# Patient Record
Sex: FEMALE | Race: ASIAN | Employment: UNEMPLOYED | URBAN - METROPOLITAN AREA
[De-identification: names, ages, dates, MRNs, and addresses within clinical notes are randomized per-mention and may not be internally consistent; named-entity substitution may affect disease eponyms.]

---

## 2019-01-24 ENCOUNTER — HOSPITAL ENCOUNTER (OUTPATIENT)
Dept: GENERAL RADIOLOGY | Age: 29
Discharge: HOME | End: 2019-01-24
Payer: SUBSIDIZED

## 2019-01-24 DIAGNOSIS — M79.671 PAIN OF BOTH HEELS: ICD-10-CM

## 2019-01-24 DIAGNOSIS — M79.672 PAIN OF BOTH HEELS: ICD-10-CM

## 2019-01-24 PROCEDURE — 73630 X-RAY EXAM OF FOOT: CPT

## 2019-04-08 ENCOUNTER — HOSPITAL ENCOUNTER (OUTPATIENT)
Dept: MAMMOGRAPHY | Age: 29
Discharge: HOME | End: 2019-04-08
Attending: FAMILY MEDICINE

## 2019-04-08 DIAGNOSIS — N64.52 NIPPLE DISCHARGE: ICD-10-CM

## 2019-04-19 ENCOUNTER — HOSPITAL ENCOUNTER (OUTPATIENT)
Dept: MAMMOGRAPHY | Age: 29
Discharge: HOME | End: 2019-04-19
Attending: FAMILY MEDICINE
Payer: SUBSIDIZED

## 2019-04-19 DIAGNOSIS — N64.52 NIPPLE DISCHARGE: ICD-10-CM

## 2019-04-19 PROCEDURE — 76642 ULTRASOUND BREAST LIMITED: CPT

## 2019-05-14 ENCOUNTER — HOSPITAL ENCOUNTER (OUTPATIENT)
Dept: LAB | Age: 29
Discharge: HOME | End: 2019-05-14

## 2019-05-14 LAB
ALBUMIN SERPL-MCNC: 3.7 G/DL (ref 3.5–5)
ALBUMIN/GLOB SERPL: 1.2 {RATIO} (ref 1.1–2.2)
ALP SERPL-CCNC: 80 U/L (ref 45–117)
ALT SERPL-CCNC: 15 U/L (ref 12–78)
ANION GAP SERPL CALC-SCNC: 6 MMOL/L (ref 5–15)
AST SERPL-CCNC: 15 U/L (ref 15–37)
BASOPHILS # BLD: 0.1 K/UL (ref 0–0.1)
BASOPHILS NFR BLD: 1 % (ref 0–1)
BILIRUB SERPL-MCNC: 0.2 MG/DL (ref 0.2–1)
BUN SERPL-MCNC: 13 MG/DL (ref 6–20)
BUN/CREAT SERPL: 19 (ref 12–20)
CALCIUM SERPL-MCNC: 8.6 MG/DL (ref 8.5–10.1)
CHLORIDE SERPL-SCNC: 112 MMOL/L (ref 97–108)
CO2 SERPL-SCNC: 23 MMOL/L (ref 21–32)
CREAT SERPL-MCNC: 0.7 MG/DL (ref 0.55–1.02)
DIFFERENTIAL METHOD BLD: ABNORMAL
EOSINOPHIL # BLD: 0.6 K/UL (ref 0–0.4)
EOSINOPHIL NFR BLD: 9 % (ref 0–7)
ERYTHROCYTE [DISTWIDTH] IN BLOOD BY AUTOMATED COUNT: 13.4 % (ref 11.5–14.5)
GLOBULIN SER CALC-MCNC: 3 G/DL (ref 2–4)
GLUCOSE SERPL-MCNC: 116 MG/DL (ref 65–100)
HCT VFR BLD AUTO: 39.8 % (ref 35–47)
HGB BLD-MCNC: 12.2 G/DL (ref 11.5–16)
IMM GRANULOCYTES # BLD AUTO: 0 K/UL (ref 0–0.04)
IMM GRANULOCYTES NFR BLD AUTO: 0 % (ref 0–0.5)
LYMPHOCYTES # BLD: 2 K/UL (ref 0.8–3.5)
LYMPHOCYTES NFR BLD: 32 % (ref 12–49)
MCH RBC QN AUTO: 27.4 PG (ref 26–34)
MCHC RBC AUTO-ENTMCNC: 30.7 G/DL (ref 30–36.5)
MCV RBC AUTO: 89.2 FL (ref 80–99)
MONOCYTES # BLD: 0.5 K/UL (ref 0–1)
MONOCYTES NFR BLD: 8 % (ref 5–13)
NEUTS SEG # BLD: 3.2 K/UL (ref 1.8–8)
NEUTS SEG NFR BLD: 50 % (ref 32–75)
NRBC # BLD: 0 K/UL (ref 0–0.01)
NRBC BLD-RTO: 0 PER 100 WBC
PLATELET # BLD AUTO: 142 K/UL (ref 150–400)
PMV BLD AUTO: 14.3 FL (ref 8.9–12.9)
POTASSIUM SERPL-SCNC: 4 MMOL/L (ref 3.5–5.1)
PROT SERPL-MCNC: 6.7 G/DL (ref 6.4–8.2)
RBC # BLD AUTO: 4.46 M/UL (ref 3.8–5.2)
SODIUM SERPL-SCNC: 141 MMOL/L (ref 136–145)
TSH SERPL DL<=0.05 MIU/L-ACNC: 0.37 UIU/ML (ref 0.36–3.74)
WBC # BLD AUTO: 6.3 K/UL (ref 3.6–11)

## 2019-05-14 PROCEDURE — 84146 ASSAY OF PROLACTIN: CPT

## 2019-05-14 PROCEDURE — 84443 ASSAY THYROID STIM HORMONE: CPT

## 2019-05-14 PROCEDURE — 80053 COMPREHEN METABOLIC PANEL: CPT

## 2019-05-14 PROCEDURE — 84439 ASSAY OF FREE THYROXINE: CPT

## 2019-05-14 PROCEDURE — 85025 COMPLETE CBC W/AUTO DIFF WBC: CPT

## 2019-05-14 PROCEDURE — 84480 ASSAY TRIIODOTHYRONINE (T3): CPT

## 2019-05-15 LAB — PROLACTIN SERPL-MCNC: 11.3 NG/ML

## 2019-05-16 LAB — T4 FREE SERPL-MCNC: 1.1 NG/DL (ref 0.8–1.5)

## 2019-05-17 LAB — T3 SERPL-MCNC: 96 NG/DL (ref 71–180)

## 2019-05-24 ENCOUNTER — HOSPITAL ENCOUNTER (OUTPATIENT)
Dept: ULTRASOUND IMAGING | Age: 29
Discharge: HOME | End: 2019-05-24
Attending: FAMILY MEDICINE
Payer: SUBSIDIZED

## 2019-05-24 DIAGNOSIS — N92.1 MENORRHAGIA WITH IRREGULAR CYCLE: ICD-10-CM

## 2019-05-24 PROCEDURE — 76830 TRANSVAGINAL US NON-OB: CPT

## 2019-05-24 PROCEDURE — 76856 US EXAM PELVIC COMPLETE: CPT

## 2019-09-26 ENCOUNTER — HOSPITAL ENCOUNTER (OUTPATIENT)
Dept: LAB | Age: 29
Discharge: HOME | End: 2019-09-26

## 2019-09-26 PROCEDURE — 87086 URINE CULTURE/COLONY COUNT: CPT

## 2019-09-28 LAB
BACTERIA SPEC CULT: ABNORMAL
BACTERIA SPEC CULT: ABNORMAL
CC UR VC: ABNORMAL
SERVICE CMNT-IMP: ABNORMAL

## 2019-12-09 ENCOUNTER — HOSPITAL ENCOUNTER (OUTPATIENT)
Dept: ULTRASOUND IMAGING | Age: 29
Discharge: HOME | End: 2019-12-09
Attending: FAMILY MEDICINE
Payer: SUBSIDIZED

## 2019-12-09 ENCOUNTER — HOSPITAL ENCOUNTER (OUTPATIENT)
Dept: LAB | Age: 29
Discharge: HOME | End: 2019-12-09

## 2019-12-09 DIAGNOSIS — N83.202 CYST OF LEFT OVARY: ICD-10-CM

## 2019-12-09 PROCEDURE — 76830 TRANSVAGINAL US NON-OB: CPT

## 2019-12-09 PROCEDURE — 76856 US EXAM PELVIC COMPLETE: CPT

## 2019-12-09 PROCEDURE — 87086 URINE CULTURE/COLONY COUNT: CPT

## 2019-12-11 LAB
BACTERIA SPEC CULT: NORMAL
CC UR VC: NORMAL
SERVICE CMNT-IMP: NORMAL

## 2019-12-23 ENCOUNTER — HOSPITAL ENCOUNTER (OUTPATIENT)
Dept: LAB | Age: 29
Discharge: HOME | End: 2019-12-23

## 2019-12-23 PROCEDURE — 87086 URINE CULTURE/COLONY COUNT: CPT

## 2019-12-25 LAB
BACTERIA SPEC CULT: NORMAL
CC UR VC: NORMAL
SERVICE CMNT-IMP: NORMAL

## 2020-01-06 ENCOUNTER — HOSPITAL ENCOUNTER (OUTPATIENT)
Dept: LAB | Age: 30
Discharge: HOME | End: 2020-01-06

## 2020-01-06 LAB
ALBUMIN SERPL-MCNC: 3.6 G/DL (ref 3.5–5)
ALBUMIN/GLOB SERPL: 1.1 {RATIO} (ref 1.1–2.2)
ALP SERPL-CCNC: 93 U/L (ref 45–117)
ALT SERPL-CCNC: 20 U/L (ref 12–78)
ANION GAP SERPL CALC-SCNC: 4 MMOL/L (ref 5–15)
AST SERPL-CCNC: 13 U/L (ref 15–37)
BILIRUB SERPL-MCNC: 0.2 MG/DL (ref 0.2–1)
BUN SERPL-MCNC: 9 MG/DL (ref 6–20)
BUN/CREAT SERPL: 16 (ref 12–20)
CALCIUM SERPL-MCNC: 8.5 MG/DL (ref 8.5–10.1)
CHLORIDE SERPL-SCNC: 109 MMOL/L (ref 97–108)
CO2 SERPL-SCNC: 27 MMOL/L (ref 21–32)
CREAT SERPL-MCNC: 0.55 MG/DL (ref 0.55–1.02)
GLOBULIN SER CALC-MCNC: 3.4 G/DL (ref 2–4)
GLUCOSE SERPL-MCNC: 88 MG/DL (ref 65–100)
HCG SERPL QL: NEGATIVE
POTASSIUM SERPL-SCNC: 4.6 MMOL/L (ref 3.5–5.1)
PROT SERPL-MCNC: 7 G/DL (ref 6.4–8.2)
SODIUM SERPL-SCNC: 140 MMOL/L (ref 136–145)

## 2020-01-06 PROCEDURE — 80053 COMPREHEN METABOLIC PANEL: CPT

## 2020-01-06 PROCEDURE — 84703 CHORIONIC GONADOTROPIN ASSAY: CPT

## 2020-01-06 PROCEDURE — 36415 COLL VENOUS BLD VENIPUNCTURE: CPT

## 2020-01-06 PROCEDURE — 85025 COMPLETE CBC W/AUTO DIFF WBC: CPT

## 2020-01-07 LAB
BASOPHILS # BLD: 0.1 K/UL (ref 0–0.1)
BASOPHILS NFR BLD: 1 % (ref 0–1)
DIFFERENTIAL METHOD BLD: NORMAL
EOSINOPHIL # BLD: 0.2 K/UL (ref 0–0.4)
EOSINOPHIL NFR BLD: 3 % (ref 0–7)
ERYTHROCYTE [DISTWIDTH] IN BLOOD BY AUTOMATED COUNT: 14 % (ref 11.5–14.5)
HCT VFR BLD AUTO: 38.5 % (ref 35–47)
HGB BLD-MCNC: 11.9 G/DL (ref 11.5–16)
IMM GRANULOCYTES # BLD AUTO: 0 K/UL (ref 0–0.04)
IMM GRANULOCYTES NFR BLD AUTO: 0 % (ref 0–0.5)
LYMPHOCYTES # BLD: 2.8 K/UL (ref 0.8–3.5)
LYMPHOCYTES NFR BLD: 39 % (ref 12–49)
MCH RBC QN AUTO: 27.4 PG (ref 26–34)
MCHC RBC AUTO-ENTMCNC: 30.9 G/DL (ref 30–36.5)
MCV RBC AUTO: 88.7 FL (ref 80–99)
MONOCYTES # BLD: 0.4 K/UL (ref 0–1)
MONOCYTES NFR BLD: 6 % (ref 5–13)
NEUTS SEG # BLD: 3.6 K/UL (ref 1.8–8)
NEUTS SEG NFR BLD: 51 % (ref 32–75)
NRBC # BLD: 0 K/UL (ref 0–0.01)
NRBC BLD-RTO: 0 PER 100 WBC
PLATELET # BLD AUTO: 161 K/UL (ref 150–400)
PLATELET COMMENTS,PCOM: NORMAL
RBC # BLD AUTO: 4.34 M/UL (ref 3.8–5.2)
RBC MORPH BLD: NORMAL
WBC # BLD AUTO: 7.1 K/UL (ref 3.6–11)

## 2021-03-02 ENCOUNTER — TRANSCRIBE ORDER (OUTPATIENT)
Dept: SCHEDULING | Age: 31
End: 2021-03-02

## 2021-03-02 DIAGNOSIS — R10.2 PELVIC PAIN: Primary | ICD-10-CM

## 2024-01-26 ENCOUNTER — OFFICE VISIT (OUTPATIENT)
Age: 34
End: 2024-01-26
Payer: MEDICAID

## 2024-01-26 VITALS
SYSTOLIC BLOOD PRESSURE: 112 MMHG | BODY MASS INDEX: 34.05 KG/M2 | HEIGHT: 63 IN | HEART RATE: 74 BPM | TEMPERATURE: 96.8 F | WEIGHT: 192.2 LBS | OXYGEN SATURATION: 98 % | RESPIRATION RATE: 18 BRPM | DIASTOLIC BLOOD PRESSURE: 69 MMHG

## 2024-01-26 DIAGNOSIS — K21.9 GASTROESOPHAGEAL REFLUX DISEASE WITHOUT ESOPHAGITIS: ICD-10-CM

## 2024-01-26 DIAGNOSIS — M25.562 ARTHRALGIA OF BOTH KNEES: ICD-10-CM

## 2024-01-26 DIAGNOSIS — G89.29 CHRONIC NECK PAIN: ICD-10-CM

## 2024-01-26 DIAGNOSIS — M54.2 CHRONIC NECK PAIN: ICD-10-CM

## 2024-01-26 DIAGNOSIS — M25.561 ARTHRALGIA OF BOTH KNEES: ICD-10-CM

## 2024-01-26 DIAGNOSIS — Z76.89 ENCOUNTER TO ESTABLISH CARE: Primary | ICD-10-CM

## 2024-01-26 DIAGNOSIS — R79.89 ELEVATED SERUM FREE T4 LEVEL: ICD-10-CM

## 2024-01-26 PROCEDURE — 99204 OFFICE O/P NEW MOD 45 MIN: CPT | Performed by: STUDENT IN AN ORGANIZED HEALTH CARE EDUCATION/TRAINING PROGRAM

## 2024-01-26 RX ORDER — OMEPRAZOLE 20 MG/1
20 TABLET, DELAYED RELEASE ORAL DAILY
COMMUNITY
Start: 2024-01-08 | End: 2024-01-26

## 2024-01-26 RX ORDER — NORETHINDRONE ACETATE/ETHINYL ESTRADIOL AND FERROUS FUMARATE 1MG-20(21)
KIT ORAL
COMMUNITY
Start: 2023-11-21 | End: 2024-01-26

## 2024-01-26 RX ORDER — SUCRALFATE 1 G/1
1 TABLET ORAL 3 TIMES DAILY
COMMUNITY
Start: 2023-12-10 | End: 2024-01-26

## 2024-01-26 RX ORDER — METHYLPREDNISOLONE 4 MG/1
TABLET ORAL
COMMUNITY
Start: 2024-01-16 | End: 2024-01-26

## 2024-01-26 RX ORDER — METOCLOPRAMIDE 10 MG/1
10 TABLET ORAL 2 TIMES DAILY
COMMUNITY
Start: 2023-12-10 | End: 2024-01-26

## 2024-01-26 RX ORDER — MELOXICAM 7.5 MG/1
7.5 TABLET ORAL DAILY
Qty: 90 TABLET | Refills: 1 | Status: SHIPPED | OUTPATIENT
Start: 2024-01-26

## 2024-01-26 SDOH — ECONOMIC STABILITY: FOOD INSECURITY: WITHIN THE PAST 12 MONTHS, YOU WORRIED THAT YOUR FOOD WOULD RUN OUT BEFORE YOU GOT MONEY TO BUY MORE.: NEVER TRUE

## 2024-01-26 SDOH — ECONOMIC STABILITY: HOUSING INSECURITY
IN THE LAST 12 MONTHS, WAS THERE A TIME WHEN YOU DID NOT HAVE A STEADY PLACE TO SLEEP OR SLEPT IN A SHELTER (INCLUDING NOW)?: NO

## 2024-01-26 SDOH — ECONOMIC STABILITY: INCOME INSECURITY: HOW HARD IS IT FOR YOU TO PAY FOR THE VERY BASICS LIKE FOOD, HOUSING, MEDICAL CARE, AND HEATING?: NOT HARD AT ALL

## 2024-01-26 SDOH — ECONOMIC STABILITY: FOOD INSECURITY: WITHIN THE PAST 12 MONTHS, THE FOOD YOU BOUGHT JUST DIDN'T LAST AND YOU DIDN'T HAVE MONEY TO GET MORE.: NEVER TRUE

## 2024-01-26 ASSESSMENT — PATIENT HEALTH QUESTIONNAIRE - PHQ9
SUM OF ALL RESPONSES TO PHQ QUESTIONS 1-9: 2
SUM OF ALL RESPONSES TO PHQ9 QUESTIONS 1 & 2: 2
SUM OF ALL RESPONSES TO PHQ QUESTIONS 1-9: 2
SUM OF ALL RESPONSES TO PHQ QUESTIONS 1-9: 2
1. LITTLE INTEREST OR PLEASURE IN DOING THINGS: 1
2. FEELING DOWN, DEPRESSED OR HOPELESS: 1
SUM OF ALL RESPONSES TO PHQ QUESTIONS 1-9: 2

## 2024-01-26 NOTE — PROGRESS NOTES
Chief Complaint   Patient presents with    New Patient     Body ache   Left and knee pain  Can barely go up a flight a stairs.  Medication refill        \"Have you been to the ER, urgent care clinic since your last visit?  Hospitalized since your last visit?\"    NO    “Have you seen or consulted any other health care providers outside of [facility] since your last visit?”    NO        “Have you had a pap smear?”    NO           Vitals:    24 1129   BP: 112/69   Pulse: 74   Resp: 18   Temp: 96.8 °F (36 °C)   SpO2: 98%        Health Maintenance Due   Topic Date Due    Hepatitis B vaccine (1 of 3 - 3-dose series) Never done    COVID-19 Vaccine (1) Never done    Varicella vaccine (1 of 2 - 2-dose childhood series) Never done    Depression Screen  Never done    HIV screen  Never done    Hepatitis C screen  Never done    DTaP/Tdap/Td vaccine (1 - Tdap) Never done    Cervical cancer screen  Never done    Flu vaccine (1) Never done        The patient, Alice Potter, identity was verified by name and .   
medications such as metoclopramide in the past without improvement in symptoms or only temporary relief.  She is [facility]     Labs  Patient also brings in a copy of her labs completed on 1/15.  Labs significant for elevated T4 of 1.2, low T3 of 0.83, with a normal TSH of 0.7600.  Hemoglobin A1c was 5.16.  Vitamin B12, folate were normal.  CBC was also within normal limits.  Rheumatoid factor was negative and CAROL screening was negative.  CRP was also negative.  Normal vitamin D level.  Cholesterol was low the HDL at 38 otherwise within normal limits.  CMP was also normal      Review of systems:   A comprehensive review of systems was negative except as written in the HPI.    History    No Known Allergies    History reviewed. No pertinent past medical history.    Past Surgical History:   Procedure Laterality Date    TONSILLECTOMY          Family History   Problem Relation Age of Onset    Hypertension Other         throughout the family        Social History     Socioeconomic History    Marital status:      Spouse name: Not on file    Number of children: Not on file    Years of education: 1    Highest education level: Not on file   Occupational History    Not on file   Tobacco Use    Smoking status: Never    Smokeless tobacco: Never   Vaping Use    Vaping Use: Never used   Substance and Sexual Activity    Alcohol use: Never    Drug use: Never    Sexual activity: Yes     Partners: Male   Other Topics Concern    Not on file   Social History Narrative    Not on file     Social Determinants of Health     Financial Resource Strain: Low Risk  (1/26/2024)    Overall Financial Resource Strain (CARDIA)     Difficulty of Paying Living Expenses: Not hard at all   Food Insecurity: No Food Insecurity (1/26/2024)    Hunger Vital Sign     Worried About Running Out of Food in the Last Year: Never true     Ran Out of Food in the Last Year: Never true

## 2024-02-09 ENCOUNTER — OFFICE VISIT (OUTPATIENT)
Age: 34
End: 2024-02-09
Payer: MEDICAID

## 2024-02-09 VITALS
HEIGHT: 63 IN | WEIGHT: 193.4 LBS | DIASTOLIC BLOOD PRESSURE: 77 MMHG | SYSTOLIC BLOOD PRESSURE: 113 MMHG | OXYGEN SATURATION: 99 % | BODY MASS INDEX: 34.27 KG/M2 | TEMPERATURE: 98.2 F | RESPIRATION RATE: 18 BRPM | HEART RATE: 68 BPM

## 2024-02-09 DIAGNOSIS — R07.81 RIB PAIN: ICD-10-CM

## 2024-02-09 DIAGNOSIS — M54.2 CHRONIC NECK PAIN: Primary | ICD-10-CM

## 2024-02-09 DIAGNOSIS — L30.9 DERMATITIS: ICD-10-CM

## 2024-02-09 DIAGNOSIS — M99.01 SOMATIC DYSFUNCTION OF CERVICAL REGION: ICD-10-CM

## 2024-02-09 DIAGNOSIS — G89.29 CHRONIC NECK PAIN: Primary | ICD-10-CM

## 2024-02-09 DIAGNOSIS — M99.08 SOMATIC DYSFUNCTION OF RIB: ICD-10-CM

## 2024-02-09 DIAGNOSIS — M99.00 SOMATIC DYSFUNCTION OF HEAD REGION: ICD-10-CM

## 2024-02-09 DIAGNOSIS — M99.06 SOMATIC DYSFUNCTION OF LOWER EXTREMITY: ICD-10-CM

## 2024-02-09 DIAGNOSIS — M99.02 SOMATIC DYSFUNCTION OF THORACIC REGION: ICD-10-CM

## 2024-02-09 PROCEDURE — 99213 OFFICE O/P EST LOW 20 MIN: CPT | Performed by: STUDENT IN AN ORGANIZED HEALTH CARE EDUCATION/TRAINING PROGRAM

## 2024-02-09 PROCEDURE — 98927 OSTEOPATH MANJ 5-6 REGIONS: CPT | Performed by: STUDENT IN AN ORGANIZED HEALTH CARE EDUCATION/TRAINING PROGRAM

## 2024-02-09 RX ORDER — BENZOCAINE/MENTHOL 6 MG-10 MG
LOZENGE MUCOUS MEMBRANE
Qty: 30 G | Refills: 2 | Status: SHIPPED | OUTPATIENT
Start: 2024-02-09 | End: 2024-02-16

## 2024-02-09 RX ORDER — CYCLOBENZAPRINE HCL 5 MG
5 TABLET ORAL NIGHTLY PRN
Qty: 30 TABLET | Refills: 1 | Status: SHIPPED | OUTPATIENT
Start: 2024-02-09

## 2024-02-09 RX ORDER — DOCUSATE SODIUM 100 MG/1
200 CAPSULE, LIQUID FILLED ORAL DAILY
COMMUNITY
Start: 2024-01-31

## 2024-02-09 RX ORDER — PANTOPRAZOLE SODIUM 40 MG/1
40 TABLET, DELAYED RELEASE ORAL DAILY
COMMUNITY
Start: 2024-01-31

## 2024-02-09 RX ORDER — POLYETHYLENE GLYCOL 3350 17 G/17G
POWDER, FOR SOLUTION ORAL
COMMUNITY
Start: 2024-01-31

## 2024-02-09 RX ORDER — POLYETHYLENE GLYCOL 3350, SODIUM SULFATE ANHYDROUS, SODIUM BICARBONATE, SODIUM CHLORIDE, POTASSIUM CHLORIDE 236; 22.74; 6.74; 5.86; 2.97 G/4L; G/4L; G/4L; G/4L; G/4L
POWDER, FOR SOLUTION ORAL
COMMUNITY
Start: 2024-02-02

## 2024-02-09 SDOH — ECONOMIC STABILITY: FOOD INSECURITY: WITHIN THE PAST 12 MONTHS, THE FOOD YOU BOUGHT JUST DIDN'T LAST AND YOU DIDN'T HAVE MONEY TO GET MORE.: NEVER TRUE

## 2024-02-09 SDOH — ECONOMIC STABILITY: INCOME INSECURITY: HOW HARD IS IT FOR YOU TO PAY FOR THE VERY BASICS LIKE FOOD, HOUSING, MEDICAL CARE, AND HEATING?: NOT HARD AT ALL

## 2024-02-09 SDOH — ECONOMIC STABILITY: FOOD INSECURITY: WITHIN THE PAST 12 MONTHS, YOU WORRIED THAT YOUR FOOD WOULD RUN OUT BEFORE YOU GOT MONEY TO BUY MORE.: NEVER TRUE

## 2024-02-09 ASSESSMENT — PATIENT HEALTH QUESTIONNAIRE - PHQ9
SUM OF ALL RESPONSES TO PHQ QUESTIONS 1-9: 0
1. LITTLE INTEREST OR PLEASURE IN DOING THINGS: 0
SUM OF ALL RESPONSES TO PHQ QUESTIONS 1-9: 0
SUM OF ALL RESPONSES TO PHQ QUESTIONS 1-9: 0
2. FEELING DOWN, DEPRESSED OR HOPELESS: 0
SUM OF ALL RESPONSES TO PHQ9 QUESTIONS 1 & 2: 0
SUM OF ALL RESPONSES TO PHQ QUESTIONS 1-9: 0

## 2024-02-09 NOTE — PROGRESS NOTES
Chief Complaint   Patient presents with    Follow-up     OMT        \"Have you been to the ER, urgent care clinic since your last visit?  Hospitalized since your last visit?\"    NO    “Have you seen or consulted any other [facility]?”    YES - When: approximately 10 days ago.  Where and Why: [facility] Dr. Tripp.        “Have you had a pap smear?”    NO           Vitals:    24 1106   BP: 113/77   Pulse: 68   Resp: 18   Temp: 98.2 °F (36.8 °C)   SpO2: 99%        Health Maintenance Due   Topic Date Due    Hepatitis B vaccine (1 of 3 - 3-dose series) Never done    COVID-19 Vaccine (1) Never done    Varicella vaccine (1 of 2 - 2-dose childhood series) Never done    HIV screen  Never done    Hepatitis C screen  Never done    DTaP/Tdap/Td vaccine (1 - Tdap) Never done    Cervical cancer screen  Never done    Flu vaccine (1) Never done        The patient, Alice Potter, identity was verified by name and .   
verbalized understanding with the diagnosis(es) and plan.       Return in about 4 weeks (around 3/8/2024) for Follow-up. For mood and pain    Va Hernandez DO    02/09/24   1:56 PM

## 2024-02-09 NOTE — PATIENT INSTRUCTIONS
Post-OMT Instructions    It is not uncommon to have some mild soreness for 1-2 days after treatment. You can take Tylenol or Ibuprofen as instructed to help with the discomfort. You can also apply gentle heat to areas with sore muscles. Be sure to drink plenty of water to help your muscles recover. Avoid strenuous activity for the rest of today.

## 2024-02-22 NOTE — PROGRESS NOTES
[facility]  [address] [place] [address] [phone]    Osteopathic Manipulative Medicine Visit   (Procedure Note)              Subjective     CC:   Chief Complaint   Patient presents with    Follow-up     OMT  Patient states she has stopped physical therapy but she do the exercise at home.      HPI: Alice Potter is a 34 y.o. female presenting to the clinic today for evaluation and consideration of OMT for the following concerns:  She presents with her  who serves as her .  Patient offered video  but declined.  Pain  Patient has had pain in her knees and ankles for the past 8 months.  Is currently going to physical therapy and states that this has been helping with her leg pain.  Does have chronic neck pain which she states gets worse especially during times of stress.  X-ray did demonstrate some osteophytes.  Previous interventions: Previous medical treatment, physical therapy  Hardware present?  None  Previous imaging?     Cervical spine x-ray 01/26/2024  The spinal alignment is normal.  Vertebral morphology is normal. Anterior disc osteophytes at C5-6 and C6-7. There are no identifiable paravertebral soft tissue abnormalities. Prevertebral soft tissues unremarkable. Atlanto-dental interval within normal limits. No evidence of subluxation.   IMPRESSION:  No acute fracture or dislocation.      Rash  -Patient states that she has also had a rash on her legs, neck, and trunk for the past few days due to.  States that it causes some itching.  Denies changing detergents.  Unsure the cause    Review of systems:   A comprehensive review of systems was negative except as written in the HPI.    History  Patient's allergies, medical, surgical, social, and family hx reviewed and available in chart. Updates made where appropriate.       Objective     /74 (Site: Right Upper Arm, Position: Sitting, Cuff Size: Large Adult)

## 2024-02-23 ENCOUNTER — OFFICE VISIT (OUTPATIENT)
Age: 34
End: 2024-02-23
Payer: MEDICAID

## 2024-02-23 VITALS
TEMPERATURE: 97.7 F | RESPIRATION RATE: 18 BRPM | HEIGHT: 63 IN | OXYGEN SATURATION: 98 % | BODY MASS INDEX: 34.48 KG/M2 | HEART RATE: 74 BPM | SYSTOLIC BLOOD PRESSURE: 112 MMHG | WEIGHT: 194.6 LBS | DIASTOLIC BLOOD PRESSURE: 74 MMHG

## 2024-02-23 DIAGNOSIS — F41.9 ANXIETY AND DEPRESSION: ICD-10-CM

## 2024-02-23 DIAGNOSIS — N64.4 CYCLICAL BREAST PAIN: Primary | ICD-10-CM

## 2024-02-23 DIAGNOSIS — G89.29 CHRONIC PAIN OF LEFT KNEE: ICD-10-CM

## 2024-02-23 DIAGNOSIS — F32.A ANXIETY AND DEPRESSION: ICD-10-CM

## 2024-02-23 DIAGNOSIS — M25.562 CHRONIC PAIN OF LEFT KNEE: ICD-10-CM

## 2024-02-23 PROCEDURE — 99214 OFFICE O/P EST MOD 30 MIN: CPT | Performed by: STUDENT IN AN ORGANIZED HEALTH CARE EDUCATION/TRAINING PROGRAM

## 2024-02-23 RX ORDER — DULOXETIN HYDROCHLORIDE 30 MG/1
30 CAPSULE, DELAYED RELEASE ORAL DAILY
Qty: 90 CAPSULE | Refills: 1 | Status: SHIPPED | OUTPATIENT
Start: 2024-02-23

## 2024-02-23 ASSESSMENT — PATIENT HEALTH QUESTIONNAIRE - PHQ9
SUM OF ALL RESPONSES TO PHQ9 QUESTIONS 1 & 2: 0
SUM OF ALL RESPONSES TO PHQ QUESTIONS 1-9: 0
1. LITTLE INTEREST OR PLEASURE IN DOING THINGS: 0
SUM OF ALL RESPONSES TO PHQ QUESTIONS 1-9: 0
SUM OF ALL RESPONSES TO PHQ QUESTIONS 1-9: 0
2. FEELING DOWN, DEPRESSED OR HOPELESS: 0
SUM OF ALL RESPONSES TO PHQ QUESTIONS 1-9: 0

## 2024-02-23 NOTE — PROGRESS NOTES
[facility]  [address] [place] [address] [phone]    Office Visit      Assessment and Plan     1. Cyclical breast pain  Chronic, uncontrolled.  Suspect that patient has some hormonal breast tenderness that corresponds with her cycles.  Does have fibrocystic breast tissue without any evidence of mass or infection on examination.  Trial Voltaren gel.    - diclofenac sodium (VOLTAREN) 1 % GEL; Apply 2 g topically 4 times daily as needed for Pain  Dispense: 100 g; Refill: 3    2. Chronic pain of left knee  Chronic, uncontrolled.  Trial Voltaren gel.  If pain continues, would recommend steroid injection  - diclofenac sodium (VOLTAREN) 1 % GEL; Apply 2 g topically 4 times daily as needed for Pain  Dispense: 100 g; Refill: 3    3. Anxiety and depression  Chronic, uncontrolled.  Start Cymbalta.  Suspect that this may help with the pain as well  - DULoxetine (CYMBALTA) 30 MG extended release capsule; Take 1 capsule by mouth daily  Dispense: 90 capsule; Refill: 1        Return in about 8 weeks (around 4/19/2024) for Follow-up.         Discussed the expected course, resolution and complications of the diagnosis(es) in detail.  Medication risks, benefits, costs, interactions, and alternatives were discussed as indicated.  Patient to contact the office if their condition worsens, changes or fails to improve. Pt verbalized understanding with the diagnosis(es) and plan.           Va Hernandez DO    02/23/24   4:36 PM        Subjective     CC:   Chief Complaint   Patient presents with    Follow-up     OMT  Patient states she has stopped physical therapy but she do the exercise at home.        Pt  has no past medical history on file.    HPI: Alice Potter is a 34 y.o. female presenting to the clinic today for evaluation and/or follow-up of the following concerns:      Breast pain  Patient reports that back in 2019, she had an ultrasound for her breast due

## 2024-02-23 NOTE — PROGRESS NOTES
Chief Complaint   Patient presents with    Follow-up     OMT  Patient states she has stopped physical therapy but she do the exercise at home.         \"Have you been to the ER, urgent care clinic since your last visit?  Hospitalized since your last visit?\"    NO    “Have you seen or consulted any other health care providers outside of [facility] since your last visit?”    NO        “Have you had a pap smear?”    NO           Vitals:    24 1135   BP: 112/74   Pulse: 74   Resp: 18   Temp: 97.7 °F (36.5 °C)   SpO2: 98%        Health Maintenance Due   Topic Date Due    Hepatitis B vaccine (1 of 3 - 3-dose series) Never done    COVID-19 Vaccine (1) Never done    Varicella vaccine (1 of 2 - 2-dose childhood series) Never done    HIV screen  Never done    Hepatitis C screen  Never done    DTaP/Tdap/Td vaccine (1 - Tdap) Never done    Cervical cancer screen  Never done    Flu vaccine (1) Never done        The patient, Alice Potter, identity was verified by name and .

## 2024-04-12 ENCOUNTER — OFFICE VISIT (OUTPATIENT)
Age: 34
End: 2024-04-12
Payer: MEDICAID

## 2024-04-12 VITALS
OXYGEN SATURATION: 98 % | BODY MASS INDEX: 33.27 KG/M2 | WEIGHT: 187.8 LBS | TEMPERATURE: 97.6 F | HEART RATE: 66 BPM | HEIGHT: 63 IN | RESPIRATION RATE: 20 BRPM | DIASTOLIC BLOOD PRESSURE: 75 MMHG | SYSTOLIC BLOOD PRESSURE: 111 MMHG

## 2024-04-12 DIAGNOSIS — G89.29 CHRONIC PAIN OF LEFT KNEE: ICD-10-CM

## 2024-04-12 DIAGNOSIS — N94.6 DYSMENORRHEA: ICD-10-CM

## 2024-04-12 DIAGNOSIS — M25.562 CHRONIC PAIN OF LEFT KNEE: ICD-10-CM

## 2024-04-12 DIAGNOSIS — Z02.9 ENCOUNTER FOR ADMINISTRATIVE EXAMINATIONS: Primary | ICD-10-CM

## 2024-04-12 DIAGNOSIS — J30.2 SEASONAL ALLERGIES: ICD-10-CM

## 2024-04-12 DIAGNOSIS — F41.9 ANXIETY AND DEPRESSION: ICD-10-CM

## 2024-04-12 DIAGNOSIS — F32.A ANXIETY AND DEPRESSION: ICD-10-CM

## 2024-04-12 PROCEDURE — 99215 OFFICE O/P EST HI 40 MIN: CPT | Performed by: STUDENT IN AN ORGANIZED HEALTH CARE EDUCATION/TRAINING PROGRAM

## 2024-04-12 RX ORDER — FLUTICASONE PROPIONATE 50 MCG
2 SPRAY, SUSPENSION (ML) NASAL DAILY
Qty: 1 EACH | Refills: 5 | Status: SHIPPED | OUTPATIENT
Start: 2024-04-12

## 2024-04-12 RX ORDER — NORETHINDRONE ACETATE AND ETHINYL ESTRADIOL 1MG-20(21)
1 KIT ORAL DAILY
Qty: 3 PACKET | Refills: 3 | Status: SHIPPED | OUTPATIENT
Start: 2024-04-12

## 2024-04-12 ASSESSMENT — PATIENT HEALTH QUESTIONNAIRE - PHQ9
1. LITTLE INTEREST OR PLEASURE IN DOING THINGS: NOT AT ALL
SUM OF ALL RESPONSES TO PHQ QUESTIONS 1-9: 0

## 2024-04-12 NOTE — PROGRESS NOTES
Chief Complaint   Patient presents with    Paperwork        \"Have you been to the ER, urgent care clinic since your last visit?  Hospitalized since your last visit?\"    NO    “Have you seen or consulted any other health care providers outside of [facility] since your last visit?”    NO        “Have you had a pap smear?”    NO    No cervical cancer screening on file            Vitals:    24 1137   BP: 111/75   Pulse: 66   Resp: 20   Temp: 97.6 °F (36.4 °C)   SpO2: 98%        Health Maintenance Due   Topic Date Due    Hepatitis B vaccine (1 of 3 - 3-dose series) Never done    COVID-19 Vaccine (1) Never done    Varicella vaccine (1 of 2 - 2-dose childhood series) Never done    HIV screen  Never done    Hepatitis C screen  Never done    DTaP/Tdap/Td vaccine (1 - Tdap) Never done    Cervical cancer screen  Never done        The patient, Alice Potter, identity was verified by name and .

## 2024-04-12 NOTE — PROGRESS NOTES
[facility]  [address] [place] [address] [phone]    Office Visit      Assessment and Plan     1. Encounter for administrative examinations  Based upon previous encounters with patient, patient's history, and examination today, I do suspect the patient has an underlying learning disability along with uncontrolled depression anxiety has likely caused issues with short-term memory which has made it very difficult for her to learn new skills/tasks including the English language.  Patient is able to understand the meaning of such except explained in basic terms in her own language, but is unable to recite any memorized information word to word.  Will complete these forms and have them mailed to patient    2. Chronic pain of left knee  Chronic, uncontrolled.  Patient has not had much relief with conservative measures and exercises.  Will have her return for steroid injection    3. Anxiety and depression  Chronic, improving.  Continue Cymbalta, can increase to next dose at next visit    4. Dysmenorrhea  Chronic, stable.  - norethindrone-ethinyl estradiol (LOESTRIN FE 1/20) 1-20 MG-MCG per tablet; Take 1 tablet by mouth daily  Dispense: 3 packet; Refill: 3    5. Seasonal allergies  Chronic, stable.  - fluticasone (FLONASE) 50 MCG/ACT nasal spray; 2 sprays by Each Nostril route daily  Dispense: 1 each; Refill: 5        RTC in 1 month for steroid injection    Total time: 65 minutes. This includes time spent with the patient during the visit as well as time spent before and after the visit reviewing the chart, documenting the encounter, making phone calls, reviewing studies, etc.      Discussed the expected course, resolution and complications of the diagnosis(es) in detail.  Medication risks, benefits, costs, interactions, and alternatives were discussed as indicated.  Patient to contact the office if their condition worsens, changes or fails to improve.

## 2024-04-25 PROBLEM — F81.9 BASIC LEARNING DISABILITY: Status: ACTIVE | Noted: 2024-04-25

## 2024-05-10 ENCOUNTER — OFFICE VISIT (OUTPATIENT)
Age: 34
End: 2024-05-10
Payer: MEDICAID

## 2024-05-10 VITALS
HEIGHT: 63 IN | HEART RATE: 68 BPM | WEIGHT: 187.6 LBS | RESPIRATION RATE: 20 BRPM | OXYGEN SATURATION: 99 % | SYSTOLIC BLOOD PRESSURE: 107 MMHG | DIASTOLIC BLOOD PRESSURE: 69 MMHG | TEMPERATURE: 98.1 F | BODY MASS INDEX: 33.24 KG/M2

## 2024-05-10 DIAGNOSIS — G89.29 CHRONIC PAIN OF RIGHT KNEE: ICD-10-CM

## 2024-05-10 DIAGNOSIS — M25.561 CHRONIC PAIN OF RIGHT KNEE: ICD-10-CM

## 2024-05-10 DIAGNOSIS — F81.9 BASIC LEARNING DISABILITY: ICD-10-CM

## 2024-05-10 DIAGNOSIS — G89.29 CHRONIC PAIN OF LEFT KNEE: Primary | ICD-10-CM

## 2024-05-10 DIAGNOSIS — M25.562 CHRONIC PAIN OF LEFT KNEE: Primary | ICD-10-CM

## 2024-05-10 DIAGNOSIS — J30.2 SEASONAL ALLERGIES: ICD-10-CM

## 2024-05-10 DIAGNOSIS — N94.6 DYSMENORRHEA: ICD-10-CM

## 2024-05-10 PROCEDURE — 20610 DRAIN/INJ JOINT/BURSA W/O US: CPT | Performed by: STUDENT IN AN ORGANIZED HEALTH CARE EDUCATION/TRAINING PROGRAM

## 2024-05-10 PROCEDURE — 99215 OFFICE O/P EST HI 40 MIN: CPT | Performed by: STUDENT IN AN ORGANIZED HEALTH CARE EDUCATION/TRAINING PROGRAM

## 2024-05-10 RX ORDER — TRIAMCINOLONE ACETONIDE 40 MG/ML
40 INJECTION, SUSPENSION INTRA-ARTICULAR; INTRAMUSCULAR ONCE
Status: COMPLETED | OUTPATIENT
Start: 2024-05-10 | End: 2024-05-10

## 2024-05-10 RX ORDER — NORETHINDRONE ACETATE AND ETHINYL ESTRADIOL 1MG-20(21)
1 KIT ORAL DAILY
Qty: 3 PACKET | Refills: 4 | Status: SHIPPED | OUTPATIENT
Start: 2024-05-10

## 2024-05-10 RX ORDER — LIDOCAINE HYDROCHLORIDE 20 MG/ML
4 INJECTION, SOLUTION INFILTRATION; PERINEURAL ONCE
Status: COMPLETED | OUTPATIENT
Start: 2024-05-10 | End: 2024-05-10

## 2024-05-10 RX ORDER — CETIRIZINE HYDROCHLORIDE 10 MG/1
10 TABLET ORAL DAILY
Qty: 90 TABLET | Refills: 1 | Status: SHIPPED | OUTPATIENT
Start: 2024-05-10

## 2024-05-10 RX ADMIN — TRIAMCINOLONE ACETONIDE 40 MG: 40 INJECTION, SUSPENSION INTRA-ARTICULAR; INTRAMUSCULAR at 13:47

## 2024-05-10 RX ADMIN — LIDOCAINE HYDROCHLORIDE 4 ML: 20 INJECTION, SOLUTION INFILTRATION; PERINEURAL at 13:46

## 2024-05-10 ASSESSMENT — PATIENT HEALTH QUESTIONNAIRE - PHQ9
SUM OF ALL RESPONSES TO PHQ QUESTIONS 1-9: 0
1. LITTLE INTEREST OR PLEASURE IN DOING THINGS: NOT AT ALL
2. FEELING DOWN, DEPRESSED OR HOPELESS: NOT AT ALL
SUM OF ALL RESPONSES TO PHQ9 QUESTIONS 1 & 2: 0
SUM OF ALL RESPONSES TO PHQ QUESTIONS 1-9: 0

## 2024-05-10 NOTE — PROGRESS NOTES
Chief Complaint   Patient presents with    Injections     Both knee          \"Have you been to the ER, urgent care clinic since your last visit?  Hospitalized since your last visit?\"    NO    “Have you seen or consulted any other health care providers outside of [facility] since your last visit?”    NO        “Have you had a pap smear?”    NO    No cervical cancer screening on file            Vitals:    05/10/24 1155   BP: 107/69   Pulse: 68   Resp: 20   Temp: 98.1 °F (36.7 °C)   SpO2: 99%        Health Maintenance Due   Topic Date Due    Hepatitis B vaccine (1 of 3 - 3-dose series) Never done    COVID-19 Vaccine (1) Never done    Varicella vaccine (1 of 2 - 2-dose childhood series) Never done    HIV screen  Never done    Hepatitis C screen  Never done    DTaP/Tdap/Td vaccine (1 - Tdap) Never done    Cervical cancer screen  Never done        The patient, Alice Potter, identity was verified by name and .

## 2024-05-10 NOTE — PROGRESS NOTES
[facility]  [address] [place] [address] [phone]      Procedure Note:    Procedure Name: Knee Joint Injection-left  Indication: Pain  Pre-Procedure Diagnosis: Chronic Left Knee Pain  Post-Procedure Diagnosis: Same    Informed Consent and Counseling: The procedure, alternative treatment options, risks, and benefits were thoroughly explained to the patient and written informed consent was obtained.  Appropriate equipment and medications were set up.    PROCEDURE:  The appropriate timeout was taken. We identified and marked the appropriate anatomic landmarks to guide needle placement.The area was prepped in the usual sterile fashion and the overlying skin cleaned using povidone iodine .  Local anesthesia achieved using Ethyl Chloride spray (Cooling spray). 2 ml of Lidocaine 2% without epinephrine and 1ml of Kenalog 40 mg/1mL in a needle of appropriate length and gauge was injected into the joint using lateral approach. Gentle aspiration before injection didn’t show any blood.    Estimated blood loss was less than 0.5 cc.    A dressing was applied to the area. Anticipatory guidance, as well as standard post-procedure care, was explained. Return precautions were given. The patient tolerated the procedure well without complications. Follow-up visit scheduled.       Procedure Note:    Procedure Name: Knee Joint Injection-right  Indication: Pain  Pre-Procedure Diagnosis: Chronic Right Knee Pain  Post-Procedure Diagnosis: Same    Informed Consent and Counseling: The procedure, alternative treatment options, risks, and benefits were thoroughly explained to the patient and written informed consent was obtained.  Appropriate equipment and medications were set up.    PROCEDURE:  The appropriate timeout was taken. We identified and marked the appropriate anatomic landmarks to guide needle placement.The area was prepped in the usual sterile fashion and the

## 2024-05-10 NOTE — PROGRESS NOTES
[facility]  [address] [place] [address] [phone]    Office Visit      Assessment and Plan     1. Chronic pain of left knee  Chronic, uncontrolled.  Discussed at previous appointments that if patient was not having improvement in her pain, could consider steroid injections.  Reviewed the risk and benefits of injection with patient and all questions answered.  Injections provided.  Recommended for patient to continue to do her home exercise therapy as well  - triamcinolone acetonide (KENALOG-40) injection 40 mg  - lidocaine 2 % injection 4 mL  - DRAIN/INJECT LARGE JOINT/BURSA    2. Chronic pain of right knee  Chronic, uncontrolled.  Discussed at previous appointments that if patient was not having improvement in her pain, could consider steroid injections.  Reviewed the risk and benefits of injection with patient and all questions answered.  Injections provided.  Recommended for patient to continue to do her home exercise therapy as well  - triamcinolone acetonide (KENALOG-40) injection 40 mg  - lidocaine 2 % injection 4 mL  - DRAIN/INJECT LARGE JOINT/BURSA    3. Basic learning disability  Corrected patient's medical certification for disability form and also received patient's daily number to update the forms.  Will have this mailed off and also uploaded to patient's    4. Dysmenorrhea  Chronic, uncontrolled.  Switched to patient's preferred brand  - norethindrone-ethinyl estradiol (DENICE FE 1/20) 1-20 MG-MCG per tablet; Take 1 tablet by mouth daily  Dispense: 3 packet; Refill: 4    5. Seasonal allergies  Chronic, uncontrolled.  - cetirizine (ZYRTEC) 10 MG tablet; Take 1 tablet by mouth daily For allergies  Dispense: 90 tablet; Refill: 1        Return if symptoms worsen or fail to improve.       Total time: 48 minutes. This includes time spent with the patient during the visit as well as time spent before and after the visit reviewing the chart,

## 2024-07-05 ENCOUNTER — TELEPHONE (OUTPATIENT)
Age: 34
End: 2024-07-05

## 2024-07-05 NOTE — TELEPHONE ENCOUNTER
----- Message from Va Croft sent at 7/5/2024  3:41 PM EDT -----  Regarding: [facility] Appointment Request  [facility] Appointment Request    Patient needs appointment for [facility] Appointment Type: Existing Condition Follow Up.    Patient Requested Dates(s): anytime  Patient Requested Time:  Friday after lunch  or 11:00  Provider Name:Va Hernandez DO    Reason for Appointment Request: Established Patient - No appointments available during search  --------------------------------------------------------------------------------------------------------------------------    Relationship to Patient: Spouse/Partner/Edilson Potter  Call Back Information: OK to leave message on voicemail  Preferred Call Back Number: Phone [phone] (Mobile)       Caller want to book an appointment for the patient for the follow of thyroid injection for knee.

## 2024-08-08 ENCOUNTER — OFFICE VISIT (OUTPATIENT)
Age: 34
End: 2024-08-08
Payer: MEDICAID

## 2024-08-08 VITALS
DIASTOLIC BLOOD PRESSURE: 74 MMHG | TEMPERATURE: 97.4 F | RESPIRATION RATE: 20 BRPM | SYSTOLIC BLOOD PRESSURE: 120 MMHG | BODY MASS INDEX: 34.09 KG/M2 | HEART RATE: 72 BPM | HEIGHT: 63 IN | WEIGHT: 192.4 LBS | OXYGEN SATURATION: 98 %

## 2024-08-08 DIAGNOSIS — G43.019 INTRACTABLE MIGRAINE WITHOUT AURA AND WITHOUT STATUS MIGRAINOSUS: Primary | ICD-10-CM

## 2024-08-08 DIAGNOSIS — M54.42 CHRONIC BILATERAL LOW BACK PAIN WITH LEFT-SIDED SCIATICA: ICD-10-CM

## 2024-08-08 DIAGNOSIS — G89.29 CHRONIC BILATERAL LOW BACK PAIN WITH LEFT-SIDED SCIATICA: ICD-10-CM

## 2024-08-08 PROCEDURE — 99214 OFFICE O/P EST MOD 30 MIN: CPT | Performed by: STUDENT IN AN ORGANIZED HEALTH CARE EDUCATION/TRAINING PROGRAM

## 2024-08-08 RX ORDER — CYCLOBENZAPRINE HCL 5 MG
TABLET ORAL
Qty: 30 TABLET | Refills: 1 | Status: SHIPPED | OUTPATIENT
Start: 2024-08-08

## 2024-08-08 RX ORDER — RIZATRIPTAN BENZOATE 10 MG/1
TABLET ORAL
Qty: 9 TABLET | Refills: 0 | Status: SHIPPED | OUTPATIENT
Start: 2024-08-08

## 2024-08-08 ASSESSMENT — PATIENT HEALTH QUESTIONNAIRE - PHQ9
SUM OF ALL RESPONSES TO PHQ9 QUESTIONS 1 & 2: 0
SUM OF ALL RESPONSES TO PHQ QUESTIONS 1-9: 0
SUM OF ALL RESPONSES TO PHQ QUESTIONS 1-9: 0
2. FEELING DOWN, DEPRESSED OR HOPELESS: NOT AT ALL
SUM OF ALL RESPONSES TO PHQ QUESTIONS 1-9: 0
1. LITTLE INTEREST OR PLEASURE IN DOING THINGS: NOT AT ALL
SUM OF ALL RESPONSES TO PHQ QUESTIONS 1-9: 0

## 2024-08-08 NOTE — PATIENT INSTRUCTIONS
1. Intractable migraine without aura and without status migrainosus  - rizatriptan (MAXALT) 10 MG tablet; Take 1 tablet by mouth once as needed for Migraine. May repeat in 2 hours if needed  Dispense: 9 tablet; Refill: 0    2. Chronic bilateral low back pain with left-sided sciatica  - cyclobenzaprine (FLEXERIL) 5 MG tablet; Take 1-2 tablets by mouth at bedtime for back pain  Dispense: 30 tablet; Refill: 1

## 2024-08-08 NOTE — PROGRESS NOTES
[facility]  [address] [place] [address] [phone]    Office Visit      Assessment and Plan     1. Intractable migraine without aura and without status migrainosus  Acute. Can use ginger for nausea   - rizatriptan (MAXALT) 10 MG tablet; Take 1 tablet by mouth once as needed for Migraine. May repeat in 2 hours if needed  Dispense: 9 tablet; Refill: 0    2. Chronic bilateral low back pain with left-sided sciatica  Chronic, controlled.  Try heat and stretches.   - cyclobenzaprine (FLEXERIL) 5 MG tablet; Take 1-2 tablets by mouth at bedtime for back pain  Dispense: 30 tablet; Refill: 1        Return in about 2 months (around 10/8/2024) for Follow-up.         Discussed the expected course, resolution and complications of the diagnosis(es) in detail.  Medication risks, benefits, costs, interactions, and alternatives were discussed as indicated.  Patient to contact the office if their condition worsens, changes or fails to improve. Pt verbalized understanding with the diagnosis(es) and plan.           Va Hernandez DO    08/08/24   1:10 PM        Subjective     CC:   Chief Complaint   Patient presents with    Follow-up     Patient complains of lower back pain and headaches        Pt  has no past medical history on file.    HPI: Alice Potter is a 34 y.o. female presenting to the clinic today for evaluation and/or follow-up of the following concerns:      MSK pain/ headache  Having headache for the past few days. Pain is mainly in the forehead and behind the eye. Pain is still present, but is getting somewhat better. Motrin and tylenol makes it better. Has some seasonal allergy symptoms. Not feeling this as much now. Some nausea. No clear aura but has light sensitivity  Went to GI doctor yesterday, BP was 140/80. Today BP is 120/74  Having lower back pain as well.  Whole body feels fatigued  Pain is in the lower lumbar, has been getting better and

## 2024-08-08 NOTE — PROGRESS NOTES
Chief Complaint   Patient presents with    Follow-up     Patient complains of lower back pain and headaches        \"Have you been to the ER, urgent care clinic since your last visit?  Hospitalized since your last visit?\"    NO    “Have you seen or consulted any other health care providers outside of [facility] since your last visit?”    NO     “Have you had a pap smear?”    NO    No cervical cancer screening on file             Click Here for Release of Records Request     No results found for this visit on 24.   Vitals:    24 1122   BP: 120/74   Site: Right Upper Arm   Position: Sitting   Cuff Size: Large Adult   Pulse: 72   Resp: 20   Temp: 97.4 °F (36.3 °C)   TempSrc: Temporal   SpO2: 98%   Weight: 87.3 kg (192 lb 6.4 oz)   Height: 1.6 m (5' 3\")      Health Maintenance Due   Topic Date Due    Hepatitis B vaccine (1 of 3 - 3-dose series) Never done    COVID-19 Vaccine (1) Never done    Varicella vaccine (1 of 2 - 2-dose childhood series) Never done    HIV screen  Never done    Hepatitis C screen  Never done    DTaP/Tdap/Td vaccine (1 - Tdap) Never done    Cervical cancer screen  Never done    Flu vaccine (1) Never done        The patient, Alice Potter, identity was verified by name and .

## 2024-09-20 ENCOUNTER — TELEPHONE (OUTPATIENT)
Age: 34
End: 2024-09-20

## 2024-09-20 NOTE — TELEPHONE ENCOUNTER
Spoke with pt  and he stated he need to see another Dr for his wife citizenship. I told him I have to ask another provider and will give him a call back Monday

## 2024-09-23 ENCOUNTER — TELEPHONE (OUTPATIENT)
Age: 34
End: 2024-09-23

## 2024-10-04 ENCOUNTER — TELEMEDICINE (OUTPATIENT)
Age: 34
End: 2024-10-04

## 2024-10-04 DIAGNOSIS — F81.9 BASIC LEARNING DISABILITY: ICD-10-CM

## 2024-10-04 DIAGNOSIS — G43.019 INTRACTABLE MIGRAINE WITHOUT AURA AND WITHOUT STATUS MIGRAINOSUS: ICD-10-CM

## 2024-10-04 DIAGNOSIS — Z02.9 ENCOUNTER FOR ADMINISTRATIVE EXAMINATIONS: Primary | ICD-10-CM

## 2024-10-04 DIAGNOSIS — F41.8 TEST ANXIETY: ICD-10-CM

## 2024-10-04 RX ORDER — RIZATRIPTAN BENZOATE 10 MG/1
TABLET ORAL
Qty: 9 TABLET | Refills: 0 | Status: SHIPPED | OUTPATIENT
Start: 2024-10-04

## 2024-10-04 RX ORDER — PROPRANOLOL HCL 10 MG
TABLET ORAL
Qty: 60 TABLET | Refills: 1 | Status: SHIPPED | OUTPATIENT
Start: 2024-10-04

## 2024-10-04 ASSESSMENT — PATIENT HEALTH QUESTIONNAIRE - PHQ9
SUM OF ALL RESPONSES TO PHQ9 QUESTIONS 1 & 2: 0
1. LITTLE INTEREST OR PLEASURE IN DOING THINGS: NOT AT ALL
SUM OF ALL RESPONSES TO PHQ QUESTIONS 1-9: 0
2. FEELING DOWN, DEPRESSED OR HOPELESS: NOT AT ALL

## 2024-10-04 NOTE — PROGRESS NOTES
Chief Complaint   Patient presents with    Follow-up     ER follow 10/2/2024       \"Have you been to the ER, urgent care clinic since your last visit?  Hospitalized since your last visit?\"    YES - When: approximately 3 days ago.  Where and Why: [facility] \" headache\".    “Have you seen or consulted any other health care providers outside of [facility] since your last visit?”    NO     “Have you had a pap smear?”    NO    No cervical cancer screening on file             Click Here for Release of Records Request     No results found for this visit on 10/04/24.   There were no vitals filed for this visit.   Health Maintenance Due   Topic Date Due    Varicella vaccine (1 of 2 - 13+ 2-dose series) Never done    HIV screen  Never done    Hepatitis C screen  Never done    Hepatitis B vaccine (1 of 3 - 19+ 3-dose series) Never done    DTaP/Tdap/Td vaccine (1 - Tdap) Never done    Cervical cancer screen  Never done    Flu vaccine (1) Never done    COVID-19 Vaccine (1 - 2023-24 season) Never done        The patient, Alice Potter, identity was verified by name and .

## 2024-10-04 NOTE — PROGRESS NOTES
[facility]  [address].  [place] [id number] [phone]    Virtual Visit    Alice Potter, was evaluated through a synchronous (real-time) audio-video encounter. The patient (or guardian if applicable) is aware that this is a billable service, which includes applicable co-pays. This Virtual Visit was conducted with patient's (and/or legal guardian's) consent. Patient identification was verified, and a caregiver was present when appropriate.   The patient was located at Home: [address] [address] [address]  Provider was located at Facility (Appt Dept): [address] [place] [address]      CC:   Chief Complaint   Patient presents with    Follow-up     ER follow 10/2/2024     Alice Potter (:  1990) is a Established patient, presenting virtually for evaluation of the following:  -Pt  has no past medical history on file.  -Patient was seen on camera today with her .  Patient declined formal  and would prefer for her  to translate for her.    Subjective  Patient and her  present today to discuss her citizenship paperwork.  She received notice from the department of immigration stating that they require a second evaluation from a different medical professional due to concerns of credibility.  Per my previous assessment, I was informed that patient has been unable to learn new languages and has been experiencing challenges when acquiring new skills due to her limited schooling and learning disability; along with her dependence on her .  However it appears that during the interview portion for her eligibility exam on , she was able to read, write, and answer questions regarding the [place] on the initial interview test without the assistance of her .  It also states that she is in possession of a newly issued 's license which her  states took her

## 2024-10-25 ENCOUNTER — OFFICE VISIT (OUTPATIENT)
Age: 34
End: 2024-10-25
Payer: MEDICAID

## 2024-10-25 VITALS
OXYGEN SATURATION: 98 % | HEIGHT: 63 IN | RESPIRATION RATE: 20 BRPM | WEIGHT: 189 LBS | SYSTOLIC BLOOD PRESSURE: 129 MMHG | TEMPERATURE: 97.9 F | DIASTOLIC BLOOD PRESSURE: 80 MMHG | BODY MASS INDEX: 33.49 KG/M2 | HEART RATE: 75 BPM

## 2024-10-25 DIAGNOSIS — F32.A ANXIETY AND DEPRESSION: ICD-10-CM

## 2024-10-25 DIAGNOSIS — F41.9 ANXIETY AND DEPRESSION: ICD-10-CM

## 2024-10-25 DIAGNOSIS — M54.42 CHRONIC BILATERAL LOW BACK PAIN WITH LEFT-SIDED SCIATICA: Primary | ICD-10-CM

## 2024-10-25 DIAGNOSIS — G89.29 CHRONIC BILATERAL LOW BACK PAIN WITH LEFT-SIDED SCIATICA: Primary | ICD-10-CM

## 2024-10-25 DIAGNOSIS — R73.03 PREDIABETES: ICD-10-CM

## 2024-10-25 DIAGNOSIS — G43.019 INTRACTABLE MIGRAINE WITHOUT AURA AND WITHOUT STATUS MIGRAINOSUS: ICD-10-CM

## 2024-10-25 LAB — HBA1C MFR BLD: 5.4 %

## 2024-10-25 PROCEDURE — 99214 OFFICE O/P EST MOD 30 MIN: CPT | Performed by: STUDENT IN AN ORGANIZED HEALTH CARE EDUCATION/TRAINING PROGRAM

## 2024-10-25 PROCEDURE — 83036 HEMOGLOBIN GLYCOSYLATED A1C: CPT | Performed by: STUDENT IN AN ORGANIZED HEALTH CARE EDUCATION/TRAINING PROGRAM

## 2024-10-25 RX ORDER — CYCLOBENZAPRINE HCL 5 MG
TABLET ORAL
Qty: 30 TABLET | Refills: 2 | Status: SHIPPED | OUTPATIENT
Start: 2024-10-25

## 2024-10-25 RX ORDER — TOPIRAMATE 50 MG/1
50 TABLET, FILM COATED ORAL 2 TIMES DAILY
Qty: 60 TABLET | Refills: 2 | Status: SHIPPED | OUTPATIENT
Start: 2024-11-24

## 2024-10-25 RX ORDER — TOPIRAMATE 25 MG/1
TABLET, FILM COATED ORAL
Qty: 77 TABLET | Refills: 0 | Status: SHIPPED | OUTPATIENT
Start: 2024-10-25 | End: 2024-11-22

## 2024-10-25 RX ORDER — DULOXETIN HYDROCHLORIDE 30 MG/1
30 CAPSULE, DELAYED RELEASE ORAL DAILY
Qty: 90 CAPSULE | Refills: 1 | Status: SHIPPED | OUTPATIENT
Start: 2024-10-25

## 2024-10-25 ASSESSMENT — PATIENT HEALTH QUESTIONNAIRE - PHQ9
SUM OF ALL RESPONSES TO PHQ QUESTIONS 1-9: 0
SUM OF ALL RESPONSES TO PHQ QUESTIONS 1-9: 0
2. FEELING DOWN, DEPRESSED OR HOPELESS: NOT AT ALL
SUM OF ALL RESPONSES TO PHQ QUESTIONS 1-9: 0
SUM OF ALL RESPONSES TO PHQ9 QUESTIONS 1 & 2: 0
SUM OF ALL RESPONSES TO PHQ QUESTIONS 1-9: 0
1. LITTLE INTEREST OR PLEASURE IN DOING THINGS: NOT AT ALL

## 2024-10-25 NOTE — PROGRESS NOTES
[facility]  [address] [place] [address] [phone]    Office Visit      Assessment and Plan     1. Chronic bilateral low back pain with left-sided sciatica  Chronic, uncontrolled.  Discussed that patient should restart the cyclobenzaprine at night.  Would also recommend applying heat to the lower back and stretching  - cyclobenzaprine (FLEXERIL) 5 MG tablet; Take 1-2 tablets by mouth at bedtime for back pain  Dispense: 30 tablet; Refill: 2    2. Intractable migraine without aura and without status migrainosus  Chronic, improved.  Continue to take the Cymbalta.  Patient has Maxalt as needed    3. Prediabetes  Chronic, resolved.  A1c today is under the prediabetic limit.  Discussed importance of continuing to limit carbohydrate intake  - AMB POC HEMOGLOBIN A1C    4. BMI 33.0-33.9,adult  Chronic, uncontrolled.  Nutritional recommendations given.  Start Topamax titrating to 50 mg twice daily  - topiramate (TOPAMAX) 25 MG tablet; Take 1 tablet by mouth nightly for 7 days, THEN 1 tablet in the morning and at bedtime for 7 days, THEN 2 tablets in the morning and at bedtime for 14 days.  Dispense: 77 tablet; Refill: 0  - topiramate (TOPAMAX) 50 MG tablet; Take 1 tablet by mouth 2 times daily  Dispense: 60 tablet; Refill: 2    5. Anxiety and depression  Chronic, stable.  - DULoxetine (CYMBALTA) 30 MG extended release capsule; Take 1 capsule by mouth daily  Dispense: 90 capsule; Refill: 1        RTC in 3 months        Discussed the expected course, resolution and complications of the diagnosis(es) in detail.  Medication risks, benefits, costs, interactions, and alternatives were discussed as indicated.  Patient to contact the office if their condition worsens, changes or fails to improve. Pt verbalized understanding with the diagnosis(es) and plan.           Va Hernandez DO    10/25/24   11:45 AM        Subjective     CC:   Chief Complaint   Patient

## 2024-10-25 NOTE — PATIENT INSTRUCTIONS
Topamax:           Sig: Take 1 tablet by mouth nightly for 7 days, THEN 1 tablet in the morning and at bedtime for 7 days, THEN 2 tablets in the morning and at bedtime for 14 days.

## 2024-10-25 NOTE — PROGRESS NOTES
Chief Complaint   Patient presents with    Migraine       \"Have you been to the ER, urgent care clinic since your last visit?  Hospitalized since your last visit?\"    NO    “Have you seen or consulted any other health care providers outside of [facility] since your last visit?”    NO     “Have you had a pap smear?”    NO    No cervical cancer screening on file             Click Here for Release of Records Request     No results found for this visit on 10/25/24.   Vitals:    10/25/24 1136   BP: 129/80   Site: Right Upper Arm   Position: Sitting   Cuff Size: Large Adult   Pulse: 75   Resp: 20   Temp: 97.9 °F (36.6 °C)   TempSrc: Temporal   SpO2: 98%   Weight: 85.7 kg (189 lb)   Height: 1.6 m (5' 3\")      Health Maintenance Due   Topic Date Due    Varicella vaccine (1 of 2 - 13+ 2-dose series) Never done    HIV screen  Never done    Hepatitis C screen  Never done    Hepatitis B vaccine (1 of 3 - 19+ 3-dose series) Never done    DTaP/Tdap/Td vaccine (1 - Tdap) Never done    Cervical cancer screen  Never done    Flu vaccine (1) Never done    COVID-19 Vaccine ( season) Never done        The patient, Alice Potter, identity was verified by name and .

## 2024-11-01 RX ORDER — TOPIRAMATE 25 MG/1
TABLET, FILM COATED ORAL
Qty: 77 TABLET | Refills: 0 | OUTPATIENT
Start: 2024-11-01

## 2024-12-02 ENCOUNTER — PATIENT MESSAGE (OUTPATIENT)
Age: 34
End: 2024-12-02

## 2024-12-02 DIAGNOSIS — G43.019 INTRACTABLE MIGRAINE WITHOUT AURA AND WITHOUT STATUS MIGRAINOSUS: Primary | ICD-10-CM

## 2025-01-24 ENCOUNTER — OFFICE VISIT (OUTPATIENT)
Age: 35
End: 2025-01-24
Payer: MEDICAID

## 2025-01-24 VITALS
TEMPERATURE: 97.1 F | DIASTOLIC BLOOD PRESSURE: 67 MMHG | RESPIRATION RATE: 16 BRPM | HEIGHT: 63 IN | SYSTOLIC BLOOD PRESSURE: 99 MMHG | WEIGHT: 185 LBS | HEART RATE: 59 BPM | BODY MASS INDEX: 32.78 KG/M2 | OXYGEN SATURATION: 96 %

## 2025-01-24 DIAGNOSIS — G43.019 INTRACTABLE MIGRAINE WITHOUT AURA AND WITHOUT STATUS MIGRAINOSUS: Primary | ICD-10-CM

## 2025-01-24 DIAGNOSIS — G89.29 CHRONIC BILATERAL LOW BACK PAIN WITH LEFT-SIDED SCIATICA: ICD-10-CM

## 2025-01-24 DIAGNOSIS — F41.9 ANXIETY AND DEPRESSION: ICD-10-CM

## 2025-01-24 DIAGNOSIS — M54.42 CHRONIC BILATERAL LOW BACK PAIN WITH LEFT-SIDED SCIATICA: ICD-10-CM

## 2025-01-24 DIAGNOSIS — F32.A ANXIETY AND DEPRESSION: ICD-10-CM

## 2025-01-24 DIAGNOSIS — L91.8 SKIN TAG: ICD-10-CM

## 2025-01-24 DIAGNOSIS — J32.0 CHRONIC LEFT MAXILLARY SINUSITIS: ICD-10-CM

## 2025-01-24 PROCEDURE — 99214 OFFICE O/P EST MOD 30 MIN: CPT | Performed by: STUDENT IN AN ORGANIZED HEALTH CARE EDUCATION/TRAINING PROGRAM

## 2025-01-24 RX ORDER — DULOXETIN HYDROCHLORIDE 30 MG/1
30 CAPSULE, DELAYED RELEASE ORAL DAILY
Qty: 90 CAPSULE | Refills: 1 | Status: SHIPPED | OUTPATIENT
Start: 2025-01-24

## 2025-01-24 RX ORDER — DICLOFENAC SODIUM 75 MG/1
75 TABLET, DELAYED RELEASE ORAL DAILY PRN
Qty: 30 TABLET | Refills: 3 | Status: SHIPPED | OUTPATIENT
Start: 2025-01-24

## 2025-01-24 RX ORDER — PROPRANOLOL HYDROCHLORIDE 60 MG/1
60 CAPSULE, EXTENDED RELEASE ORAL DAILY
COMMUNITY
Start: 2025-01-18

## 2025-01-24 RX ORDER — TRIAMCINOLONE ACETONIDE 55 UG/1
2 SPRAY, METERED NASAL DAILY
Qty: 1 EACH | Refills: 2 | Status: SHIPPED | OUTPATIENT
Start: 2025-01-24

## 2025-01-24 SDOH — ECONOMIC STABILITY: FOOD INSECURITY: WITHIN THE PAST 12 MONTHS, YOU WORRIED THAT YOUR FOOD WOULD RUN OUT BEFORE YOU GOT MONEY TO BUY MORE.: NEVER TRUE

## 2025-01-24 SDOH — ECONOMIC STABILITY: FOOD INSECURITY: WITHIN THE PAST 12 MONTHS, THE FOOD YOU BOUGHT JUST DIDN'T LAST AND YOU DIDN'T HAVE MONEY TO GET MORE.: NEVER TRUE

## 2025-01-24 ASSESSMENT — PATIENT HEALTH QUESTIONNAIRE - PHQ9
3. TROUBLE FALLING OR STAYING ASLEEP: NOT AT ALL
SUM OF ALL RESPONSES TO PHQ QUESTIONS 1-9: 0
SUM OF ALL RESPONSES TO PHQ QUESTIONS 1-9: 0
6. FEELING BAD ABOUT YOURSELF - OR THAT YOU ARE A FAILURE OR HAVE LET YOURSELF OR YOUR FAMILY DOWN: NOT AT ALL
5. POOR APPETITE OR OVEREATING: NOT AT ALL
8. MOVING OR SPEAKING SO SLOWLY THAT OTHER PEOPLE COULD HAVE NOTICED. OR THE OPPOSITE, BEING SO FIGETY OR RESTLESS THAT YOU HAVE BEEN MOVING AROUND A LOT MORE THAN USUAL: NOT AT ALL
10. IF YOU CHECKED OFF ANY PROBLEMS, HOW DIFFICULT HAVE THESE PROBLEMS MADE IT FOR YOU TO DO YOUR WORK, TAKE CARE OF THINGS AT HOME, OR GET ALONG WITH OTHER PEOPLE: NOT DIFFICULT AT ALL
4. FEELING TIRED OR HAVING LITTLE ENERGY: NOT AT ALL
1. LITTLE INTEREST OR PLEASURE IN DOING THINGS: NOT AT ALL
SUM OF ALL RESPONSES TO PHQ QUESTIONS 1-9: 0
SUM OF ALL RESPONSES TO PHQ QUESTIONS 1-9: 0
9. THOUGHTS THAT YOU WOULD BE BETTER OFF DEAD, OR OF HURTING YOURSELF: NOT AT ALL
SUM OF ALL RESPONSES TO PHQ9 QUESTIONS 1 & 2: 0
7. TROUBLE CONCENTRATING ON THINGS, SUCH AS READING THE NEWSPAPER OR WATCHING TELEVISION: NOT AT ALL
2. FEELING DOWN, DEPRESSED OR HOPELESS: NOT AT ALL

## 2025-01-24 NOTE — PROGRESS NOTES
[facility]  [address] [place] [address] [phone]    Office Visit      Assessment and Plan     1. Intractable migraine without aura and without status migrainosus  Chronic, uncontrolled.  Patient will continue to follow with neurology.  She is experiencing some lower blood pressures on the increased dose of propranolol, and discussed with patient that I recommend she talk with her neurologist about adjusting this medication at her next follow-up visit    2. Anxiety and depression  Chronic, stable  - DULoxetine (CYMBALTA) 30 MG extended release capsule; Take 1 capsule by mouth daily  Dispense: 90 capsule; Refill: 1    3. Chronic left maxillary sinusitis  Chronic, uncontrolled.  The preliminary read of the most recent MRI, it appears that patient has some chronic left maxillary sinusitis.  Discussed that this may be a component of her headache pain.  Will prescribe Augmentin.  Encouraged sinus rinses, and then start Nasacort  - amoxicillin-clavulanate (AUGMENTIN) 875-125 MG per tablet; Take 1 tablet by mouth 2 times daily for 10 days For chronic sinus infection  Dispense: 20 tablet; Refill: 0  - triamcinolone (NASACORT ALLERGY 24HR) 55 MCG/ACT nasal inhaler; 2 sprays by Each Nostril route daily  Dispense: 1 each; Refill: 2    4. Chronic bilateral low back pain with left-sided sciatica  Chronic, stable.  Continue cyclobenzaprine and diclofenac as needed  - diclofenac (VOLTAREN) 75 MG EC tablet; Take 1 tablet by mouth daily as needed for Pain  Dispense: 30 tablet; Refill: 3    5. Skin tag  Patient has multiple skin tags, discussed that she can return for removal    6. BMI 33.0-33.9,adult  Chronic, uncontrolled but improving.  Congratulated patient on her recent weight loss.  Nutritional recommendations given.  Discussed incorporating exercise as tolerated.  Stop Topamax for now since neurology encouraged patient to limit medications.  However can

## 2025-02-28 ENCOUNTER — OFFICE VISIT (OUTPATIENT)
Age: 35
End: 2025-02-28
Payer: MEDICAID

## 2025-02-28 VITALS
SYSTOLIC BLOOD PRESSURE: 134 MMHG | OXYGEN SATURATION: 99 % | WEIGHT: 187.2 LBS | TEMPERATURE: 97.8 F | HEIGHT: 63 IN | BODY MASS INDEX: 33.17 KG/M2 | RESPIRATION RATE: 20 BRPM | HEART RATE: 65 BPM | DIASTOLIC BLOOD PRESSURE: 84 MMHG

## 2025-02-28 DIAGNOSIS — G43.019 INTRACTABLE MIGRAINE WITHOUT AURA AND WITHOUT STATUS MIGRAINOSUS: Primary | ICD-10-CM

## 2025-02-28 DIAGNOSIS — F41.9 ANXIETY AND DEPRESSION: ICD-10-CM

## 2025-02-28 DIAGNOSIS — M54.42 CHRONIC BILATERAL LOW BACK PAIN WITH LEFT-SIDED SCIATICA: ICD-10-CM

## 2025-02-28 DIAGNOSIS — J32.0 CHRONIC LEFT MAXILLARY SINUSITIS: ICD-10-CM

## 2025-02-28 DIAGNOSIS — G89.29 CHRONIC BILATERAL LOW BACK PAIN WITH LEFT-SIDED SCIATICA: ICD-10-CM

## 2025-02-28 DIAGNOSIS — J30.2 SEASONAL ALLERGIES: ICD-10-CM

## 2025-02-28 DIAGNOSIS — F32.A ANXIETY AND DEPRESSION: ICD-10-CM

## 2025-02-28 PROCEDURE — 99214 OFFICE O/P EST MOD 30 MIN: CPT | Performed by: STUDENT IN AN ORGANIZED HEALTH CARE EDUCATION/TRAINING PROGRAM

## 2025-02-28 RX ORDER — LORATADINE 10 MG/1
10 TABLET ORAL DAILY
Qty: 90 TABLET | Refills: 2 | Status: SHIPPED | OUTPATIENT
Start: 2025-02-28

## 2025-02-28 RX ORDER — METHOCARBAMOL 500 MG/1
500 TABLET, FILM COATED ORAL 3 TIMES DAILY PRN
Qty: 60 TABLET | Refills: 2 | Status: SHIPPED | OUTPATIENT
Start: 2025-02-28

## 2025-02-28 RX ORDER — SUMATRIPTAN SUCCINATE 25 MG/1
TABLET ORAL
COMMUNITY
Start: 2025-02-04

## 2025-02-28 RX ORDER — DULOXETIN HYDROCHLORIDE 30 MG/1
60 CAPSULE, DELAYED RELEASE ORAL DAILY
Qty: 90 CAPSULE | Refills: 1 | Status: SHIPPED
Start: 2025-02-28

## 2025-02-28 ASSESSMENT — PATIENT HEALTH QUESTIONNAIRE - PHQ9
SUM OF ALL RESPONSES TO PHQ9 QUESTIONS 1 & 2: 0
SUM OF ALL RESPONSES TO PHQ QUESTIONS 1-9: 0
2. FEELING DOWN, DEPRESSED OR HOPELESS: NOT AT ALL
1. LITTLE INTEREST OR PLEASURE IN DOING THINGS: NOT AT ALL
SUM OF ALL RESPONSES TO PHQ QUESTIONS 1-9: 0

## 2025-02-28 NOTE — PROGRESS NOTES
[facility]  [address] [place] [address] [phone]    Office Visit      Assessment and Plan     1. Intractable migraine without aura and without status migrainosus  Chronic, improved.  Patient will continue to follow with neurology.  Symptoms have improved since last visit.    2. Chronic left maxillary sinusitis  Chronic, improving.  Patient's sinusitis improved after her last course of amoxicillin.  Continue Nasacort and add Claritin.  Discussed with patient that if her sinus issues continue, would recommend referral to ENT    3. Seasonal allergies  Chronic, uncontrolled  - loratadine (CLARITIN) 10 MG tablet; Take 1 tablet by mouth daily For allergies  Dispense: 90 tablet; Refill: 2    4. Chronic bilateral low back pain with left-sided sciatica  Chronic, uncontrolled but improving.  Continue Flexeril at night, can trial Robaxin during the day.  Discussed importance of gentle stretches and heat  - methocarbamol (ROBAXIN) 500 MG tablet; Take 1 tablet by mouth 3 times daily as needed (muscle spasms)  Dispense: 60 tablet; Refill: 2    5. Anxiety and depression  Chronic, stable.  Patient's anxiety and depressive symptoms have improved on increased dose of Cymbalta  - Duloxetine (CYMBALTA) 30 MG extended release capsule; Take 2 capsules by mouth daily  Dispense: 90 capsule; Refill: 1              RTC in 6 months for well adult visit      Discussed the expected course, resolution and complications of the diagnosis(es) in detail.  Medication risks, benefits, costs, interactions, and alternatives were discussed as indicated.  Patient to contact the office if their condition worsens, changes or fails to improve. Pt verbalized understanding with the diagnosis(es) and plan.           Va Hernandez DO    02/28/25   10:06 PM        Subjective     CC:   Chief Complaint   Patient presents with    Follow-up       Pt  has no past medical history on

## 2025-02-28 NOTE — PROGRESS NOTES
Chief Complaint   Patient presents with    Follow-up       \"Have you been to the ER, urgent care clinic since your last visit?  Hospitalized since your last visit?\"    NO    “Have you seen or consulted any other health care providers outside of [facility] since your last visit?”    NO     “Have you had a pap smear?”    NO    No cervical cancer screening on file             Click Here for Release of Records Request     No results found for this visit on 25.   Vitals:    25 1556   BP: (!) 140/85   Site: Left Upper Arm   Position: Sitting   Cuff Size: Large Adult   Pulse: 65   Resp: 20   Temp: 97.8 °F (36.6 °C)   TempSrc: Temporal   SpO2: 99%   Weight: 84.9 kg (187 lb 3.2 oz)   Height: 1.6 m (5' 3\")      Health Maintenance Due   Topic Date Due    Varicella vaccine (1 of 2 - 13+ 2-dose series) Never done    HIV screen  Never done    Hepatitis C screen  Never done    Hepatitis B vaccine (1 of 3 - 19+ 3-dose series) Never done    DTaP/Tdap/Td vaccine (1 - Tdap) Never done    Cervical cancer screen  Never done        The patient, Alice Potter, identity was verified by name and .

## 2025-05-07 ENCOUNTER — TELEPHONE (OUTPATIENT)
Age: 35
End: 2025-05-07

## 2025-06-13 ENCOUNTER — OFFICE VISIT (OUTPATIENT)
Age: 35
End: 2025-06-13
Payer: MEDICAID

## 2025-06-13 VITALS
SYSTOLIC BLOOD PRESSURE: 125 MMHG | HEART RATE: 65 BPM | OXYGEN SATURATION: 99 % | RESPIRATION RATE: 20 BRPM | TEMPERATURE: 98.7 F | HEIGHT: 63 IN | DIASTOLIC BLOOD PRESSURE: 75 MMHG | WEIGHT: 189 LBS | BODY MASS INDEX: 33.49 KG/M2

## 2025-06-13 DIAGNOSIS — S22.42XA CLOSED FRACTURE OF MULTIPLE RIBS OF LEFT SIDE, INITIAL ENCOUNTER: ICD-10-CM

## 2025-06-13 DIAGNOSIS — G89.29 CHRONIC BILATERAL LOW BACK PAIN WITH LEFT-SIDED SCIATICA: ICD-10-CM

## 2025-06-13 DIAGNOSIS — R59.0 AXILLARY LYMPHADENOPATHY: ICD-10-CM

## 2025-06-13 DIAGNOSIS — R20.2 TINGLING OF UPPER EXTREMITY: ICD-10-CM

## 2025-06-13 DIAGNOSIS — J32.0 CHRONIC MAXILLARY SINUSITIS: Primary | ICD-10-CM

## 2025-06-13 DIAGNOSIS — M54.42 CHRONIC BILATERAL LOW BACK PAIN WITH LEFT-SIDED SCIATICA: ICD-10-CM

## 2025-06-13 PROCEDURE — 99215 OFFICE O/P EST HI 40 MIN: CPT | Performed by: STUDENT IN AN ORGANIZED HEALTH CARE EDUCATION/TRAINING PROGRAM

## 2025-06-13 RX ORDER — NAPROXEN 500 MG/1
500 TABLET ORAL 2 TIMES DAILY WITH MEALS
COMMUNITY
Start: 2025-06-09

## 2025-06-13 RX ORDER — LANOLIN ALCOHOL/MO/W.PET/CERES
1000 CREAM (GRAM) TOPICAL DAILY
Qty: 90 TABLET | Refills: 1 | Status: SHIPPED | OUTPATIENT
Start: 2025-06-13

## 2025-06-13 RX ORDER — HYDROCODONE BITARTRATE AND ACETAMINOPHEN 5; 325 MG/1; MG/1
1 TABLET ORAL EVERY 6 HOURS PRN
COMMUNITY
Start: 2025-06-09

## 2025-06-13 RX ORDER — MULTIVIT-MIN/IRON FUM/FOLIC AC 7.5 MG-4
1 TABLET ORAL DAILY
Qty: 90 TABLET | Refills: 1 | Status: SHIPPED | OUTPATIENT
Start: 2025-06-13

## 2025-06-13 ASSESSMENT — PATIENT HEALTH QUESTIONNAIRE - PHQ9
1. LITTLE INTEREST OR PLEASURE IN DOING THINGS: NOT AT ALL
SUM OF ALL RESPONSES TO PHQ QUESTIONS 1-9: 0
2. FEELING DOWN, DEPRESSED OR HOPELESS: NOT AT ALL
SUM OF ALL RESPONSES TO PHQ QUESTIONS 1-9: 0

## 2025-06-13 NOTE — PROGRESS NOTES
Chief Complaint   Patient presents with    Follow-up    Nausea & Vomiting     ER 2025       \"Have you been to the ER, urgent care clinic since your last visit?  Hospitalized since your last visit?\"    YES - When: approximately 4 days ago.  Where and Why: [facility] Rib [facility]racture 2025.    “Have you seen or consulted any other health care providers outside of [facility] since your last visit?”    NO     “Have you had a pap smear?”    NO    No cervical cancer screening on file             Click Here for Release of Records Request     No results found for this visit on 25.   Vitals:    25 1550 25 1553   BP: (!) 179/75 125/75   Pulse: 65    Resp: 20    Temp: 98.7 °F (37.1 °C)    TempSrc: Temporal    SpO2: 99%    Weight: 85.7 kg (189 lb)    Height: 1.6 m (5' 3\")       Health Maintenance Due   Topic Date Due    Varicella vaccine (1 of 2 - 13+ 2-dose series) Never done    HIV screen  Never done    Hepatitis C screen  Never done    Hepatitis B vaccine (1 of 3 - 19+ 3-dose series) Never done    DTaP/Tdap/Td vaccine (1 - Tdap) Never done    Cervical cancer screen  Never done        The patient, Alice Potter, identity was verified by name and .

## 2025-06-13 NOTE — PROGRESS NOTES
[facility]  [address] [place] [address] [phone]    Office Visit      Assessment and Plan      Diagnosis Orders   1. Chronic maxillary sinusitis  [facility] [place]      2. Tingling of upper extremity  vitamin B-12 (CYANOCOBALAMIN) 1000 MCG tablet    Multiple Vitamins-Minerals (MULTIVITAMIN WITH MINERALS) tablet      3. Axillary lymphadenopathy  [facility]      4. Chronic bilateral low back pain with left-sided sciatica        5. Closed fracture of multiple ribs of left side, initial encounter              Assessment & Plan  Fractures of the left 5th and 6th ribs  -Status: Persistent pain, especially at night.  Managed with naproxen and hydrocodone.  --Treatment plan: Advised to avoid heavy lifting and use compression wraps.  Can continue current supply of naproxen; patient should then transition back to diclofenac sodium if naproxen runs out.    Hand tingling  -Status: Possibly due to nerve compression.  Multivitamin with vitamin B12 prescribed.  --Treatment plan: Monitoring symptoms.    Throat pain  -Status: Likely due to postnasal drip; severe maxillary sinusitis on previous CT scan.  Not improved with OTC antihistamine or triamcinolone nasal spray  --Diagnostic plan: Referral to ENT specialist.  --Treatment plan: Monitoring symptoms.    Right axillary lymphadenopathy  -Status: Could be related to sinus issues, breast problems, or viral infection.  No family history of breast cancer; no palpable lumps but patient does have breast tenderness.  Tenderness is exacerbated by menses, but can sometimes be present outside of her her period  --Diagnostic plan: Diagnostic mammogram scheduled.  --Treatment plan: Monitoring lymph node size and symptoms.    Follow-up: 3 months    Total time: 51 minutes. This includes time spent with the patient during the visit as well as time

## 2025-06-23 ENCOUNTER — HOSPITAL ENCOUNTER (OUTPATIENT)
Facility: HOSPITAL | Age: 35
Discharge: HOME | End: 2025-06-26
Attending: STUDENT IN AN ORGANIZED HEALTH CARE EDUCATION/TRAINING PROGRAM
Payer: MEDICAID

## 2025-06-23 DIAGNOSIS — R59.0 AXILLARY LYMPHADENOPATHY: ICD-10-CM

## 2025-06-23 PROCEDURE — 76882 US LMTD JT/FCL EVL NVASC XTR: CPT

## 2025-06-23 PROCEDURE — G0279 TOMOSYNTHESIS, MAMMO: HCPCS

## 2025-06-24 ENCOUNTER — RESULTS FOLLOW-UP (OUTPATIENT)
Age: 35
End: 2025-06-24

## 2025-08-29 ENCOUNTER — OFFICE VISIT (OUTPATIENT)
Age: 35
End: 2025-08-29
Payer: MEDICAID

## 2025-08-29 VITALS
BODY MASS INDEX: 33.03 KG/M2 | HEART RATE: 76 BPM | WEIGHT: 186.4 LBS | HEIGHT: 63 IN | SYSTOLIC BLOOD PRESSURE: 120 MMHG | DIASTOLIC BLOOD PRESSURE: 82 MMHG | OXYGEN SATURATION: 98 % | RESPIRATION RATE: 20 BRPM

## 2025-08-29 DIAGNOSIS — L29.9 ITCHING OF EAR: ICD-10-CM

## 2025-08-29 DIAGNOSIS — R93.0 LEFT MAXILLARY SINUS OPACIFICATION: Primary | ICD-10-CM

## 2025-08-29 PROCEDURE — 99204 OFFICE O/P NEW MOD 45 MIN: CPT | Performed by: OTOLARYNGOLOGY

## 2025-08-29 RX ORDER — ONDANSETRON 4 MG/1
TABLET, ORALLY DISINTEGRATING ORAL
COMMUNITY
Start: 2025-08-28

## 2025-08-29 RX ORDER — FLUOCINOLONE ACETONIDE 0.11 MG/ML
OIL AURICULAR (OTIC)
Qty: 1 EACH | Refills: 0 | Status: SHIPPED | OUTPATIENT
Start: 2025-08-29

## 2025-08-29 RX ORDER — MOMETASONE FUROATE 1 MG/G
OINTMENT TOPICAL
Qty: 1 EACH | Refills: 0 | Status: SHIPPED | OUTPATIENT
Start: 2025-08-29

## 2025-08-29 RX ORDER — BENZONATATE 100 MG/1
1 CAPSULE, LIQUID FILLED ORAL DAILY
COMMUNITY
Start: 2025-06-15

## 2025-08-29 RX ORDER — MECLIZINE HYDROCHLORIDE 25 MG/1
TABLET ORAL
COMMUNITY
Start: 2025-08-28